# Patient Record
Sex: MALE | Race: NATIVE HAWAIIAN OR OTHER PACIFIC ISLANDER | NOT HISPANIC OR LATINO
[De-identification: names, ages, dates, MRNs, and addresses within clinical notes are randomized per-mention and may not be internally consistent; named-entity substitution may affect disease eponyms.]

---

## 2021-07-29 PROBLEM — Z00.00 ENCOUNTER FOR PREVENTIVE HEALTH EXAMINATION: Status: ACTIVE | Noted: 2021-07-29

## 2021-07-30 ENCOUNTER — APPOINTMENT (OUTPATIENT)
Dept: CARDIOLOGY | Facility: CLINIC | Age: 26
End: 2021-07-30
Payer: COMMERCIAL

## 2021-07-30 VITALS
RESPIRATION RATE: 16 BRPM | DIASTOLIC BLOOD PRESSURE: 90 MMHG | OXYGEN SATURATION: 97 % | HEART RATE: 60 BPM | SYSTOLIC BLOOD PRESSURE: 130 MMHG | TEMPERATURE: 97 F | BODY MASS INDEX: 25.61 KG/M2 | HEIGHT: 64 IN | WEIGHT: 150 LBS

## 2021-07-30 DIAGNOSIS — Z78.9 OTHER SPECIFIED HEALTH STATUS: ICD-10-CM

## 2021-07-30 DIAGNOSIS — Z83.49 FAMILY HISTORY OF OTHER ENDOCRINE, NUTRITIONAL AND METABOLIC DISEASES: ICD-10-CM

## 2021-07-30 PROCEDURE — 93000 ELECTROCARDIOGRAM COMPLETE: CPT

## 2021-07-30 PROCEDURE — 99204 OFFICE O/P NEW MOD 45 MIN: CPT

## 2021-07-30 NOTE — HISTORY OF PRESENT ILLNESS
[FreeTextEntry1] : The patient is a 26 y/o male with no prior cardiac history, recently had several episodes of elevated BP, once episodes of BP at 160/90, was seen in the ER at [facility], evaluated for atypical chest pain - ruled out by enzymes, labs were unremarkable and he was discharged home. He was seen by Dr. Harris and labs were sent. He has no syncope, palpitations, orthopnea, PND or edema. He presents for evaluation today. BP is borderline elevated.

## 2021-07-30 NOTE — ASSESSMENT
[FreeTextEntry1] : Chest pain- atypical. Patient is physically active, plays soccer - no angina with excellent exertional capacity\par Non-cardiac chest pain\par Stress test discussed, but I feel this would be low yeild\par \par ECG - TWI, possible posterior fascicular block - will get a 2D echo to assess LV function.\par \par HTN\par Check for secondary causes of HTN. \par Check serum metanephrines to r/o pheo\par Has elevated potassium on bloodwork, making hyperaldo unlikely\par Renal artery stenosis is also unlikely in this age group\par \par Low salt diet, exercise, relaxation techniques discussed.\par F/u after the echo and labs\par If normal - primary prevention.\par \par \par

## 2021-08-13 ENCOUNTER — APPOINTMENT (OUTPATIENT)
Dept: CARDIOLOGY | Facility: CLINIC | Age: 26
End: 2021-08-13
Payer: COMMERCIAL

## 2021-08-13 DIAGNOSIS — I10 ESSENTIAL (PRIMARY) HYPERTENSION: ICD-10-CM

## 2021-08-13 DIAGNOSIS — R07.89 OTHER CHEST PAIN: ICD-10-CM

## 2021-08-13 PROCEDURE — 93306 TTE W/DOPPLER COMPLETE: CPT

## 2021-08-13 PROCEDURE — 99213 OFFICE O/P EST LOW 20 MIN: CPT | Mod: 25

## 2021-08-13 NOTE — ASSESSMENT
[FreeTextEntry1] : Chest pain- atypical. Patient is physically active, plays soccer - no angina with excellent exertional capacity\par Non-cardiac chest pain - resolved\par \par ECG - TWI, possible posterior fascicular block - normal 2D echo \par \par HTN\par \par \par Low salt diet, exercise, relaxation techniques discussed.\par F/u in 2 months to repeat BP check\par If normal - primary prevention.\par \par \par

## 2021-08-13 NOTE — HISTORY OF PRESENT ILLNESS
[FreeTextEntry1] : The patient is a 26 y/o male with no prior cardiac history, recently had several episodes of elevated BP, once episodes of BP at 160/90, was seen in the ER at [facility], evaluated for atypical chest pain - ruled out by enzymes, labs were unremarkable and he was discharged home. He was seen by Dr. Harris and labs were sent. He has no syncope, palpitations, orthopnea, PND or edema. He presents for evaluation today. BP is borderline elevated.\par \par Presenting for f/u. BP is 138/82 today. Echo is normal.

## 2022-01-17 NOTE — REASON FOR VISIT
See ACC TE. [Symptom and Test Evaluation] : symptom and test evaluation [Hypertension] : hypertension